# Patient Record
Sex: MALE | HISPANIC OR LATINO | Employment: UNEMPLOYED
[De-identification: names, ages, dates, MRNs, and addresses within clinical notes are randomized per-mention and may not be internally consistent; named-entity substitution may affect disease eponyms.]

---

## 2022-03-21 ENCOUNTER — OFFICE VISIT (OUTPATIENT)
Dept: INTERNAL MEDICINE | Facility: OTHER | Age: 37
End: 2022-03-21

## 2022-03-21 VITALS
WEIGHT: 207.2 LBS | OXYGEN SATURATION: 97 % | DIASTOLIC BLOOD PRESSURE: 90 MMHG | HEART RATE: 65 BPM | TEMPERATURE: 97.2 F | BODY MASS INDEX: 31.4 KG/M2 | SYSTOLIC BLOOD PRESSURE: 129 MMHG | HEIGHT: 68 IN

## 2022-03-21 DIAGNOSIS — G44.84 EXERTIONAL HEADACHE: ICD-10-CM

## 2022-03-21 DIAGNOSIS — I10 HYPERTENSION, UNSPECIFIED TYPE: ICD-10-CM

## 2022-03-21 DIAGNOSIS — G44.82 HEADACHE ASSOCIATED WITH SEXUAL ACTIVITY: ICD-10-CM

## 2022-03-21 DIAGNOSIS — E66.9 OBESITY (BMI 30-39.9): ICD-10-CM

## 2022-03-21 DIAGNOSIS — Z76.89 ENCOUNTER TO ESTABLISH CARE: ICD-10-CM

## 2022-03-21 PROCEDURE — 99204 OFFICE O/P NEW MOD 45 MIN: CPT | Mod: GC | Performed by: INTERNAL MEDICINE

## 2022-03-21 ASSESSMENT — ENCOUNTER SYMPTOMS
DEPRESSION: 0
NAUSEA: 1
HEMOPTYSIS: 0
SPUTUM PRODUCTION: 0
NECK PAIN: 1
PALPITATIONS: 0
WEIGHT LOSS: 0
HEARTBURN: 0
VOMITING: 1
MYALGIAS: 0
COUGH: 0
CHILLS: 0
SPEECH CHANGE: 0
FEVER: 0
DIZZINESS: 1
HEADACHES: 1
WEAKNESS: 0
PHOTOPHOBIA: 1

## 2022-03-21 ASSESSMENT — PATIENT HEALTH QUESTIONNAIRE - PHQ9: CLINICAL INTERPRETATION OF PHQ2 SCORE: 0

## 2022-03-21 NOTE — ASSESSMENT & PLAN NOTE
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  Decrease amount of refined carbohydrates in diet  -Guided on starting working out daily, even if its with a 15 min walk, and then slowly increase his daily activity   -Nutrition referral has been placed  -Thyroid labs ordered

## 2022-03-21 NOTE — PATIENT INSTRUCTIONS
-Please drink atleast 8 glasses of water daily, please avoid redbull and alcohol  -Please get the lab work done soon  -Scan of the head has also been ordered for you to be done urgently  -Please go to the ER if you get an attack of  Severe headache   -Nutrition referral has been placed to help you manage weight  -Please keep a log of  Your blood pressure and bring itat  Your next  Visit

## 2022-03-21 NOTE — ASSESSMENT & PLAN NOTE
-Patient is here to establish care.  -His past medical, past surgical, family and social history reviewed in detail with him  -Basic labs including CBC, CMP, A1c, lipid profile and TSH ordered  -Early follow-up within 4-5 weeks

## 2022-03-21 NOTE — PROGRESS NOTES
Subjective:   Patient describes that he has been drinking a lot of red bull/energy drinks for the last few weeks.  Describes that about 2 weeks ago he experienced a severe headache that started while he was having intercourse with his girlfriend, the headache peaked in severity as he finished.  It was occipital in nature radiating towards the bilateral temporal sides and the lower part towards the center of the head.  It was associated with nausea and he had an episode of vomiting after.  Also endorses sensitivity to light during the episode.  Since then, he has had 2 similar episodes, every time while he was having intercourse.  He took a warm bath and laid down, slept to manage the headache.  He does have a history of migraines, however has not had a headache in 3-4 years.   Describes that the first time he had the headache he has also had 2 [place] bombs/alcohol.  Also describes that he has been occasionally checking the blood pressure and it was high today systolic being around 138.      CC:  Diagnoses of Obesity (BMI 30-39.9), Headache associated with sexual activity, Exertional headache, Encounter to establish care, and Hypertension, unspecified type were pertinent to this visit.    HISTORY OF THE PRESENT ILLNESS: Patient is a 37 y.o. male. This pleasant patient is here today to establish care.  He was not regularly following up with the PCP.  Describes he has a history of migraines, however has not had an episode in 3-4 years, since he had the aforementioned episode of exercise-induced headaches about 2 weeks ago.  Patient's past medical, surgical, family and social history were reviewed in detail during the visit.  -Extensive family history of diabetes and hypertension  -Patient smokes marijuana daily, denies current cigarette smoking, very occasional alcohol use.  -He has been active with the same sexual partner for the last 2 years, female.  -Has not been taking any prescription or nonprescription  "medications per patient.        Problem   Exertional Headache   Obesity (Bmi 30-39.9)   Encounter to Establish Care   High Blood Pressure       Health Maintenance: Completed    ROS:   Review of Systems   Constitutional: Negative for chills, fever and weight loss.        Weight gain    HENT: Negative for hearing loss.    Eyes: Positive for photophobia.        Sensitivity to light during the headache episodes   Respiratory: Negative for cough, hemoptysis and sputum production.    Cardiovascular: Negative for chest pain, palpitations and leg swelling.   Gastrointestinal: Positive for nausea and vomiting. Negative for heartburn.   Genitourinary: Negative for dysuria and urgency.   Musculoskeletal: Positive for neck pain. Negative for myalgias.   Skin: Negative for itching and rash.   Neurological: Positive for dizziness and headaches. Negative for speech change and weakness.   Psychiatric/Behavioral: Negative for depression.         Objective:       Exam: /90 (BP Location: Left arm, Patient Position: Sitting, BP Cuff Size: Adult)   Pulse 65   Temp 36.2 °C (97.2 °F) (Temporal)   Ht 1.715 m (5' 7.5\")   Wt 94 kg (207 lb 3.2 oz)   SpO2 97%  Body mass index is 31.97 kg/m².    Physical Exam  Constitutional:       General: He is not in acute distress.     Appearance: He is not ill-appearing, toxic-appearing or diaphoretic.   HENT:      Head: Normocephalic and atraumatic.      Right Ear: External ear normal.      Left Ear: External ear normal.      Nose: Nose normal. No congestion.      Mouth/Throat:      Mouth: Mucous membranes are moist.      Pharynx: No oropharyngeal exudate.   Eyes:      Extraocular Movements: Extraocular movements intact.      Pupils: Pupils are equal, round, and reactive to light.   Cardiovascular:      Rate and Rhythm: Normal rate.      Comments: Muffled heart sounds   Pulmonary:      Effort: Pulmonary effort is normal.      Breath sounds: Normal breath sounds.   Musculoskeletal:         " General: No swelling. Normal range of motion.   Skin:     General: Skin is warm.      Capillary Refill: Capillary refill takes less than 2 seconds.   Neurological:      Mental Status: He is alert and oriented to person, place, and time.         A chaperone was offered to the patient during today's exam. Chaperone name: Dr. Gotti  was present.    Labs: Labs have been ordered     Assessment & Plan:   37 y.o. male with no prior PCP/regular medical care is here today to establish care.     Problem List Items Addressed This Visit     Exertional headache     -Patient presents with 3 episodes of exertional headache, all associated with intercourse.  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  Decrease amount of refined carbohydrates in diet  -Guided on starting working out daily, even if its with a 15 min walk, and then slowly increase his daily activity   -Nutrition referral has been placed  -Thyroid labs ordered         Relevant Orders    HEMOGLOBIN A1C    Lipid Profile    Patient identified as having weight management issue.  Appropriate orders and counseling given.    Referral to Nutrition Services    TSH WITH REFLEX TO FT4    Encounter to establish care     -Patient is here to establish care.  -His past medical, past surgical, family and  social history reviewed in detail with him  -Basic labs including CBC, CMP, A1c, lipid profile and TSH ordered  -Early follow-up within 4-5 weeks         High blood pressure     -Positive family history of hypertension  -Patient is concerned today that the headaches are possibly caused by his high blood pressure  -Blood pressure at clinic today 129/90  Plan:  -Patient has been counseled to keep a blood pressure log and bring it at his next visit  -Counseled on avoid drinking red bull, avoid alcohol.  Drink at least 8 glasses of water daily.  -Has been counseled that anytime his headaches worsen or he gets a sudden severe headache, go to the ER           Other Visit Diagnoses     Headache associated with sexual activity        Relevant Orders    CBC WITH DIFFERENTIAL    Comp Metabolic Panel    HEMOGLOBIN A1C    Lipid Profile    TSH WITH REFLEX TO FT4    MR-MRA HEAD-WITH & W/O AND SEQUENCES              I spent a total of 30 minutes with record review, exam, communication with the patient, communication with other providers, and documentation of this encounter.    Return in about 4 weeks (around 4/18/2022).    Please note that this dictation was created using voice recognition software. I have made every reasonable attempt to correct obvious errors, but I expect that there are errors of grammar and possibly content that I did not discover before finalizing the note.

## 2022-03-21 NOTE — ASSESSMENT & PLAN NOTE
-Positive family history of hypertension  -Patient is concerned today that the headaches are possibly caused by his high blood pressure  -Blood pressure at clinic today 129/90  Plan:  -Patient has been counseled to keep a blood pressure log and bring it at his next visit  -Counseled on avoid drinking red bull, avoid alcohol.  Drink at least 8 glasses of water daily.  -Has been counseled that anytime his headaches worsen or he gets a sudden severe headache, go to the ER

## 2022-03-21 NOTE — ASSESSMENT & PLAN NOTE
-Patient presents with 3 episodes of exertional headache, all associated with intercourse.  -Describes the headache as being severe associated with sensitive to the light, nausea and vomiting  -Does have a history of migraines, however has not had an episode in 3-4 years  Plan:  -Urgent MR-MRA has been ordered, patient has been requested to go to the ER if he gets a sudden severe headache again